# Patient Record
Sex: FEMALE | Race: WHITE | Employment: FULL TIME | URBAN - METROPOLITAN AREA
[De-identification: names, ages, dates, MRNs, and addresses within clinical notes are randomized per-mention and may not be internally consistent; named-entity substitution may affect disease eponyms.]

---

## 2024-07-24 ENCOUNTER — OFFICE VISIT (OUTPATIENT)
Facility: CLINIC | Age: 29
End: 2024-07-24
Payer: COMMERCIAL

## 2024-07-24 VITALS
HEIGHT: 64 IN | TEMPERATURE: 98.3 F | DIASTOLIC BLOOD PRESSURE: 70 MMHG | WEIGHT: 235 LBS | OXYGEN SATURATION: 98 % | BODY MASS INDEX: 40.12 KG/M2 | SYSTOLIC BLOOD PRESSURE: 115 MMHG | HEART RATE: 115 BPM | RESPIRATION RATE: 16 BRPM

## 2024-07-24 DIAGNOSIS — E28.2 PCOS (POLYCYSTIC OVARIAN SYNDROME): ICD-10-CM

## 2024-07-24 DIAGNOSIS — K21.00 GASTROESOPHAGEAL REFLUX DISEASE WITH ESOPHAGITIS WITHOUT HEMORRHAGE: Primary | ICD-10-CM

## 2024-07-24 DIAGNOSIS — E66.01 CLASS 3 SEVERE OBESITY WITHOUT SERIOUS COMORBIDITY WITH BODY MASS INDEX (BMI) OF 40.0 TO 44.9 IN ADULT, UNSPECIFIED OBESITY TYPE (HCC): ICD-10-CM

## 2024-07-24 PROBLEM — E66.813 CLASS 3 SEVERE OBESITY WITHOUT SERIOUS COMORBIDITY WITH BODY MASS INDEX (BMI) OF 40.0 TO 44.9 IN ADULT: Status: ACTIVE | Noted: 2024-07-24

## 2024-07-24 PROCEDURE — 99203 OFFICE O/P NEW LOW 30 MIN: CPT | Performed by: NURSE PRACTITIONER

## 2024-07-24 RX ORDER — PANTOPRAZOLE SODIUM 20 MG/1
TABLET, DELAYED RELEASE ORAL
Qty: 30 TABLET | Refills: 1 | Status: SHIPPED | OUTPATIENT
Start: 2024-07-24

## 2024-07-24 RX ORDER — IBUPROFEN 200 MG
200 TABLET ORAL EVERY 6 HOURS PRN
COMMUNITY

## 2024-07-24 RX ORDER — VALACYCLOVIR HYDROCHLORIDE 1 G/1
TABLET, FILM COATED ORAL
COMMUNITY
Start: 2021-03-18

## 2024-07-24 RX ORDER — OMEPRAZOLE 40 MG/1
CAPSULE, DELAYED RELEASE ORAL DAILY
COMMUNITY
Start: 2024-05-28

## 2024-07-24 ASSESSMENT — ENCOUNTER SYMPTOMS
NAUSEA: 0
ABDOMINAL PAIN: 0
VOMITING: 0

## 2024-07-24 NOTE — PROGRESS NOTES
History of present illness:Kirstie Lemus is a 28 y.o. female presenting for reflux and to establish care    GERD  Mrs. Lemus reports having reflux for many years. She notices worsening symptoms if she eats any food past 7pm. She has taken Omeprazole 40mg daily for 2 years. Had an appointment for an endoscopy while living in [place], but couldn't afford it at the time.      Mrs. Lemus has PCOS, was taking Metformin 500mg once daily which was prescribed by her GYN, but stopped after moving a few months ago. She is trying to lose weight by incorporating healthy foods in her diet as well as exercise without much improvement.    Review of Systems   Constitutional:  Negative for appetite change and unexpected weight change.   Gastrointestinal:  Negative for abdominal pain, nausea and vomiting.           Past Medical History:   Diagnosis Date    Acid reflux     Irritable bowel syndrome     PCOS (polycystic ovarian syndrome)      History reviewed. No pertinent surgical history.  No family history on file.    Prior to Admission medications    Medication Sig Start Date End Date Taking? Authorizing Provider   omeprazole (PRILOSEC) 40 MG delayed release capsule Take by mouth daily 5/28/24  Yes Provider, MD Danielle   valACYclovir (VALTREX) 1 g tablet TAKE 2 TABLETS TWICE A DAY AS NEEDED 3/18/21  Yes Provider, MD Danielle   ibuprofen (ADVIL;MOTRIN) 200 MG tablet Take 1 tablet by mouth every 6 hours as needed   Yes Provider, MD Danielle   pantoprazole (PROTONIX) 20 MG tablet Take by mouth once daily 7/24/24  Yes Nafisa Doss APRN - CNP   metFORMIN (GLUCOPHAGE) 500 MG tablet Take 1 tablet by mouth daily (with breakfast) 7/24/24  Yes Nafisa Doss APRN - CNP        Allergies   Allergen Reactions    Peppermint Oil      Per patient \" full blown migraine\"    Tamiflu [Oseltamivir] Hives       Vitals:    07/24/24 1256   BP: 115/70   Site: Left Upper Arm   Position: Sitting   Cuff Size: Medium Adult   Pulse: (!)

## 2024-07-24 NOTE — PROGRESS NOTES
\"Have you been to the ER, urgent care clinic since your last visit?  Hospitalized since your last visit?\"    Yes, Urgent care Upper Respiratory Infection    “Have you seen or consulted any other health care providers outside of [facility] since your last visit?”    no     “Have you had a pap smear?”    Yes, 2023     No cervical cancer screening on file             Click Here for Release of Records Request

## 2024-07-24 NOTE — ASSESSMENT & PLAN NOTE
Rx for protonix 20mg sent to pharmacy  Referral to GI  Continue to avoid triggers  Follow up 1-2 months

## 2024-08-06 DIAGNOSIS — K21.00 GASTROESOPHAGEAL REFLUX DISEASE WITH ESOPHAGITIS WITHOUT HEMORRHAGE: Primary | ICD-10-CM

## 2024-08-06 RX ORDER — PANTOPRAZOLE SODIUM 40 MG/1
40 TABLET, DELAYED RELEASE ORAL
Qty: 90 TABLET | Refills: 1 | Status: SHIPPED | OUTPATIENT
Start: 2024-08-06

## 2024-10-19 DIAGNOSIS — E28.2 PCOS (POLYCYSTIC OVARIAN SYNDROME): ICD-10-CM

## 2024-11-22 ENCOUNTER — OFFICE VISIT (OUTPATIENT)
Age: 29
End: 2024-11-22

## 2024-11-22 VITALS
SYSTOLIC BLOOD PRESSURE: 131 MMHG | HEART RATE: 118 BPM | DIASTOLIC BLOOD PRESSURE: 85 MMHG | WEIGHT: 230 LBS | BODY MASS INDEX: 39.27 KG/M2 | HEIGHT: 64 IN

## 2024-11-22 DIAGNOSIS — Z12.4 CERVICAL CANCER SCREENING: ICD-10-CM

## 2024-11-22 DIAGNOSIS — E28.2 PCOS (POLYCYSTIC OVARIAN SYNDROME): ICD-10-CM

## 2024-11-22 DIAGNOSIS — Z31.69 INFERTILITY COUNSELING: ICD-10-CM

## 2024-11-22 DIAGNOSIS — N92.6 IRREGULAR MENSES: ICD-10-CM

## 2024-11-22 DIAGNOSIS — Z01.419 ENCOUNTER FOR GYNECOLOGICAL EXAMINATION: Primary | ICD-10-CM

## 2024-11-22 RX ORDER — ACETAMINOPHEN 325 MG/1
650 TABLET ORAL EVERY 6 HOURS PRN
COMMUNITY

## 2024-11-22 NOTE — PROGRESS NOTES
[facility]  [email or web address]  [email or web address]  [phone]    Lilia Gould MD, FACOG      OB/GYN Problem visit    HPI  Kirstie Lemus is a , 29 y.o. female who presents for a problem visit.   Chief Complaint   Patient presents with    Annual Exam     TTC x 2023       Patient with longer cycles and concerns about infertility in addition to her WWE      Per Rooming Note:  History of Present Illness  The patient presents for evaluation of primary infertility.    She was diagnosed with Polycystic Ovary Syndrome (PCOS) in 2023 or 10/2023, confirmed by an ultrasound in . She has not undergone any fertility treatments. She experienced one early miscarriage, which she attributes to a fall that resulted in a broken foot. She uses ovulation tests daily to track her Luteinizing Hormone (LH) levels. Her partner has not had a semen analysis, but she notes that he has a low sex drive and a small penis. She has lost 22 pounds since 2024 and is currently taking prenatal vitamins and metformin 500 mg once daily, which she started in 10/2023 for PCOS.    Her menstrual cycles have become more regular since moving to her current location in 2024, with cycles ranging from 39 to 45 days. She had one cycle that lasted 49 days. She used to skip cycles, but this has not occurred in the past 6 months.    She reports no new lumps or bumps in her breast tissue, although she notes that her left breast has always been smaller than the right since puberty. She has no history of abnormal Pap smears or biopsies.        Sexual history and Contraception:  Social History     Substance and Sexual Activity   Sexual Activity Yes    Partners: Male    Birth control/protection: None    Comment: TTC       Past Medical History:   Diagnosis Date    Acid reflux     H/O cold sores     oral    Irritable bowel syndrome     PCOS (polycystic ovarian syndrome) 
  Kirstie Lemus is a 29 y.o. female returns for an annual exam     Chief Complaint   Patient presents with    Annual Exam     TTC x 2023       Patient's last menstrual period was 2024.  Her periods are moderate in flow and between 39-45 days between, lasting about 5 days.  She has some dysmenorrhea.   Problems: TTC since 2023.   Birth Control: none TTC.  Last Pap: before 10/2023 WNL per pt  She does not have a history of ANGIE 2, 3 or cervical cancer.   With regard to the Gardisil vaccine, she has received all 3 injections    *partner does not have a chart in [facility]*      Examination chaperoned by Donna Cook MA.    Records labs:   10/03/2023   Prolactin: 9.5   DHEA: 274.0   Testosterone: 63.6   Free Testosterone: 8.22   Albumin: 4.6   Sex Hormone Bindin.9   TSH w/ reflex to to: 1.290      Had SAB (no pos upt per records)       Ultrasound from records from 2023 (see page 9 or 13 in records)   
file     Lack of Transportation (Non-Medical): No   Physical Activity: Insufficiently Active (7/24/2024)    Exercise Vital Sign     Days of Exercise per Week: 1 day     Minutes of Exercise per Session: 40 min   Stress: Not on file   Social Connections: Not on file   Intimate Partner Violence: Not on file   Housing Stability: Unknown (7/24/2024)    Housing Stability Vital Sign     Unable to Pay for Housing in the Last Year: Not on file     Number of Places Lived in the Last Year: Not on file     Unstable Housing in the Last Year: No     Tobacco History:  reports that she has never smoked. She has never been exposed to tobacco smoke. She has never used smokeless tobacco.  Alcohol Abuse:  reports no history of alcohol use.  Drug Abuse:  reports no history of drug use.  Allergies:   Allergies   Allergen Reactions    Peppermint Oil      Per patient \" full blown migraine\"    Tamiflu [Oseltamivir] Hives     Patient Active Problem List   Diagnosis    Gastroesophageal reflux disease with esophagitis without hemorrhage    Class 3 severe obesity without serious comorbidity with body mass index (BMI) of 40.0 to 44.9 in adult    PCOS (polycystic ovarian syndrome)       Review of Systems - History obtained from the patient and patient filled out questionnaire   Constitutional/general, HEENT, CV, Resp, GI, MSK, Neuro, Psych, Heme/lymph, Skin, Breast ROS: no significant complaints except as noted on HPI    Physical Exam  /85   Pulse (!) 118   Ht 1.626 m (5' 4\")   Wt 104.3 kg (230 lb)   LMP 11/03/2024   BMI 39.48 kg/m²     Constitutional  Appearance: alert, in no acute distress    HENT  Head and Face: appears normal    Neck  Inspection/Palpation: normal appearance    Breasts  Inspection of Breasts: breasts symmetrical, no skin changes, no discharge present, nipple appearance normal, no skin retraction present  Palpation of Breasts and Axillae: no masses present on palpation, no breast tenderness  Axillary Lymph Nodes: no

## 2024-11-23 LAB
EST. AVERAGE GLUCOSE BLD GHB EST-MCNC: 103 MG/DL
HBA1C MFR BLD: 5.2 % (ref 4–5.6)
PROLACTIN SERPL-MCNC: 9 NG/ML
T4 FREE SERPL-MCNC: 1 NG/DL (ref 0.8–1.5)
TSH SERPL DL<=0.05 MIU/L-ACNC: 1.5 UIU/ML (ref 0.36–3.74)

## 2024-11-25 LAB — DHEA-S SERPL-MCNC: 316 UG/DL (ref 84.8–378)

## 2024-11-26 LAB — 17OHP SERPL-MCNC: 58 NG/DL

## 2024-11-27 LAB
TESTOST FREE SERPL-MCNC: 3.1 PG/ML (ref 0–4.2)
TESTOST SERPL-MCNC: 61 NG/DL (ref 10–55)

## 2024-11-28 LAB
., LABCORP: NORMAL
CYTOLOGIST CVX/VAG CYTO: NORMAL
CYTOLOGY CVX/VAG DOC CYTO: NORMAL
CYTOLOGY CVX/VAG DOC THIN PREP: NORMAL
DX ICD CODE: NORMAL
Lab: NORMAL
OTHER STN SPEC: NORMAL
STAT OF ADQ CVX/VAG CYTO-IMP: NORMAL

## 2024-12-05 ENCOUNTER — TELEPHONE (OUTPATIENT)
Age: 29
End: 2024-12-05

## 2024-12-05 ENCOUNTER — LAB (OUTPATIENT)
Age: 29
End: 2024-12-05

## 2024-12-05 DIAGNOSIS — N92.6 IRREGULAR PERIODS: ICD-10-CM

## 2024-12-05 DIAGNOSIS — Z30.09 FAMILY PLANNING: Primary | ICD-10-CM

## 2024-12-07 LAB — PROGEST SERPL-MCNC: 0.2 NG/ML

## 2024-12-08 ENCOUNTER — TELEPHONE (OUTPATIENT)
Age: 29
End: 2024-12-08

## 2024-12-08 DIAGNOSIS — E28.2 PCOS (POLYCYSTIC OVARIAN SYNDROME): ICD-10-CM

## 2024-12-09 NOTE — TELEPHONE ENCOUNTER
30 yo last ov/ae 11/22/24    Rx was last sent 11  metFORMIN (GLUCOPHAGE) 500 MG tablet 60 tablet 1 11/24/2024 12/24/2024 /24/24:      Per ae plan/note:  Rec titration of metformin as tolerated based on GI sx. Increase from qd to bid. Continue increasing to 1500/2000mg /day if tolerated.       Pharmacy is requesting change in refill amount:  Pharmacy comment: REQUEST FOR 90 DAYS PRESCRIPTION.       Please review    Thank you

## 2024-12-09 NOTE — TELEPHONE ENCOUNTER
PT was contacted by RN, name and  verified    RN relayed the Rx request was received and MD sent message in regards to the refill:  Lilia Gould MD   to Me       24 12:23 PM  Ok to update to 90 day supply.    TRP    PT reports she is taking 1000 mg since her last ov 24, and has had no GI sx.   PT reports she has some Rx left over from the summer, when she took a break and did not take the medication. PT would like to \"work through\" what supply she has now and re-evaluate when she comes at the end of Feb (25).  Per PT request Rx denied at this time.      HUMBERTO

## 2025-01-28 ENCOUNTER — TELEPHONE (OUTPATIENT)
Age: 30
End: 2025-01-28

## 2025-01-28 NOTE — TELEPHONE ENCOUNTER
Called [facility], I advised they tried scheduling their semen analysis appt & she states the order form needs the ICD10 codes on it, I asked if they had a form for them to send us for the order, at first she said no, then checked the andrology dept then advised me she would be sending a form to us for him.

## 2025-01-28 NOTE — TELEPHONE ENCOUNTER
PT name and  verified    28 yo last ov/ae 24, last lab ov 24    PT calling stating they had the referral for [facility] and was told that they cannot do anything with just the referral they will need an actual order. PT asked if MW could place the order, and RN relayed that MW could not, that any orders have to be reviewed by the MD, cannot just place orders without MD's permission, verbal order.  RN relayed would send the message and see what can be done to MW and .    Please advise  Thank you

## 2025-02-10 DIAGNOSIS — K21.00 GASTROESOPHAGEAL REFLUX DISEASE WITH ESOPHAGITIS WITHOUT HEMORRHAGE: ICD-10-CM

## 2025-02-11 RX ORDER — PANTOPRAZOLE SODIUM 40 MG/1
40 TABLET, DELAYED RELEASE ORAL
Qty: 30 TABLET | Refills: 0 | Status: SHIPPED | OUTPATIENT
Start: 2025-02-11

## 2025-03-04 ENCOUNTER — LAB (OUTPATIENT)
Age: 30
End: 2025-03-04

## 2025-03-04 DIAGNOSIS — Z31.69 INFERTILITY COUNSELING: ICD-10-CM

## 2025-03-04 DIAGNOSIS — Z30.09 FAMILY PLANNING: Primary | ICD-10-CM

## 2025-03-06 LAB — PROGEST SERPL-MCNC: 8.1 NG/ML

## 2025-03-10 ENCOUNTER — LAB (OUTPATIENT)
Age: 30
End: 2025-03-10

## 2025-03-10 ENCOUNTER — PATIENT MESSAGE (OUTPATIENT)
Age: 30
End: 2025-03-10

## 2025-03-10 ENCOUNTER — TELEPHONE (OUTPATIENT)
Age: 30
End: 2025-03-10

## 2025-03-10 DIAGNOSIS — Z32.01 POSITIVE PREGNANCY TEST: ICD-10-CM

## 2025-03-10 DIAGNOSIS — N92.6 MISSED PERIOD: Primary | ICD-10-CM

## 2025-03-10 DIAGNOSIS — Z87.59 HISTORY OF MISCARRIAGE: ICD-10-CM

## 2025-03-10 DIAGNOSIS — O36.80X0 PREGNANCY OF UNKNOWN ANATOMIC LOCATION: ICD-10-CM

## 2025-03-10 NOTE — TELEPHONE ENCOUNTER
This nurse spoke to work in MD , Dr. Jimenez and he would like the patient to have blood work today and then again on 3/12/2025 to check beta hcg and then patient can meet with  on 3/13/2025 to discuss results and next steps.      Patient has been scheduled for lab work only today and on 3/12/2025 for bet hcg testing.      Patient verbalized understanding.  Lab orders placed for beta hcg as per MD verbal order .

## 2025-03-10 NOTE — TELEPHONE ENCOUNTER
Pt called triage, discussed with Nisreen, rec beta hcg today & Wednesday before appt on Thursday if ok with work-in MD (Dr. Jimenez). Nisreen will ask Dr. Jimenez

## 2025-03-10 NOTE — TELEPHONE ENCOUNTER
Two patient identifiers used      29 year old patient last seen in the office on 11/22/2024 for ae. Patient was last seen in the office on 2/28/2025 for problem visit and has infertility visit on 3/13/2025      Patient calling to say that she got a faint positive upt test 3 times today and her LMP is 1/31/2025.( 5w 3d)    Patient reports her cycles are usually 39 days in length.    Patient denies vaginal bleeding and cramping. Patient was advised to increase her po fluids.          Patient reports history of miscarriage and would like to have beta hcg checked.    ? Ok to place patient on the lab schedule for beta hcg testing for tomorrow and than patient can repeat at her upcoming visit on 3/13/2025.( Patient wants to keep visit to discuss labs and recommendations for ob MD )          Please advise    Thank you

## 2025-03-11 LAB — HCG SERPL-ACNC: 8 MIU/ML (ref 0–6)

## 2025-03-12 ENCOUNTER — RESULTS FOLLOW-UP (OUTPATIENT)
Age: 30
End: 2025-03-12

## 2025-03-12 ENCOUNTER — TELEPHONE (OUTPATIENT)
Age: 30
End: 2025-03-12

## 2025-03-12 ENCOUNTER — LAB (OUTPATIENT)
Age: 30
End: 2025-03-12

## 2025-03-12 DIAGNOSIS — R79.89 ELEVATED SERUM HCG: ICD-10-CM

## 2025-03-12 DIAGNOSIS — O36.80X0 PREGNANCY OF UNKNOWN ANATOMIC LOCATION: ICD-10-CM

## 2025-03-12 DIAGNOSIS — N92.6 MISSED MENSES: Primary | ICD-10-CM

## 2025-03-12 DIAGNOSIS — Z32.01 POSITIVE PREGNANCY TEST: ICD-10-CM

## 2025-03-12 NOTE — TELEPHONE ENCOUNTER
Two patient identifiers used        29 year old patient last seen in the office on 11/22/2024 .    Patient got a positive upt on 3/10/2025 with LMP of 1/31/2025( 5w5d)    Patient had beta done on 3/10/2025 and is scheduled for repeat beta today.    Patient is wondering why the line of her urine pregnancy testing is not getting darker and does she need to repeat her progesterone level ( last one done was 3/4/2025)    Per Dr. Gould patient was advised that not she does not need a repeat progesterone and that the most important lab right now is to  repeat the beta hcg.    Patient has will come for lab only appointment today and then has ov tomorrow to discuss recent labs and next steps.      Patient verbalized understanding.

## 2025-03-13 ENCOUNTER — TELEPHONE (OUTPATIENT)
Age: 30
End: 2025-03-13

## 2025-03-13 ENCOUNTER — OFFICE VISIT (OUTPATIENT)
Age: 30
End: 2025-03-13
Payer: COMMERCIAL

## 2025-03-13 VITALS — SYSTOLIC BLOOD PRESSURE: 126 MMHG | DIASTOLIC BLOOD PRESSURE: 88 MMHG | BODY MASS INDEX: 38.62 KG/M2 | WEIGHT: 225 LBS

## 2025-03-13 DIAGNOSIS — O36.80X0 PREGNANCY OF UNKNOWN ANATOMIC LOCATION: Primary | ICD-10-CM

## 2025-03-13 LAB — HCG SERPL-ACNC: 19 MIU/ML (ref 0–6)

## 2025-03-13 PROCEDURE — 99213 OFFICE O/P EST LOW 20 MIN: CPT | Performed by: OBSTETRICS & GYNECOLOGY

## 2025-03-13 RX ORDER — OMEPRAZOLE 20 MG/1
20 CAPSULE, DELAYED RELEASE ORAL 2 TIMES DAILY
COMMUNITY
End: 2025-03-13 | Stop reason: SDUPTHER

## 2025-03-13 RX ORDER — OMEPRAZOLE 20 MG/1
40 CAPSULE, DELAYED RELEASE ORAL DAILY
Qty: 30 CAPSULE | Refills: 0 | Status: SHIPPED | OUTPATIENT
Start: 2025-03-13

## 2025-03-13 NOTE — PROGRESS NOTES
[facility]  [email or web address]  [email or web address]  [phone]    Lilia Gould MD, FACOG      OB/GYN Problem visit    HPI  Kirstie Lemus is a , 29 y.o. female who presents for a problem visit.   Chief Complaint   Patient presents with    Pos Pregnancy Test       Ovulation .  UPT 3/10     Pt co fatigue, no bleeding, some food aversion but increased appetite.  Nervous/anxious about pregnancy.    Per Rooming Note:  Patient's last menstrual period was 2025.  Birth Control: none.     Last or next WWE is: 2024     The patient is reporting having:   Pt reports LMP 2025, progesterone done 2025, had beta hcg done 03/10/2025, had another beta hcg done 2025 & results are pending.      Pt reports needing medication for acid reflux, was taking pantoprazole, switched to her late family members 20mg of omeprazole 2 times a day      Pt has a hx of miscarriage & wants to know what can help current pregnancy.      This is a new problem.  She has experienced this problem before.     She reports the symptoms are is unchanged.  Aggravating factors include none.  And alleviating factors include none.     She does not have other concerns.    Sexual history and Contraception:  Social History     Substance and Sexual Activity   Sexual Activity Yes    Partners: Male    Birth control/protection: None    Comment: TTC       Past Medical History:   Diagnosis Date    Acid reflux     H/O cold sores     oral    Irritable bowel syndrome     Pap smear for cervical cancer screening 2024    WNL/HPV neg    PCOS (polycystic ovarian syndrome)          Current Outpatient Medications:     omeprazole (PRILOSEC) 20 MG delayed release capsule, Take 2 capsules by mouth Daily, Disp: 30 capsule, Rfl: 0    Prenatal Vit-Fe Fumarate-FA (PRENATAL PO), Take by mouth, Disp: , Rfl:     pantoprazole (PROTONIX) 40 MG tablet, TAKE 1 TABLET BY

## 2025-03-13 NOTE — TELEPHONE ENCOUNTER
Called [facility] & spoke with Laney & she reports there was unexpected downtime & the specimen has not been processed yet. She advised results should be back ideally sometime today/tonight. I advised pt is in the office now & wondering if there is a way to get the specimen ran now since we need results now. She is looking through 15 bags of specimens to try to find pt's specimen.     Round it, received it, will run STAT, hopefully within the next hour/hour & a half we should have results.

## 2025-03-13 NOTE — RESULT ENCOUNTER NOTE
Reviewed.  A little low for ovulation, what day of the cycle was this done? Or was this drawn when pos upt.

## 2025-03-13 NOTE — PROGRESS NOTES
Rooming note, gyn problem visit:    Chief Complaint   Patient presents with    Pos Pregnancy Test     Kirstie Lemus is a 29 y.o. female presents for a problem visit.    Patient's last menstrual period was 01/31/2025.  Birth Control: none.    Last or next WWE is: 11/22/2024    The patient is reporting having:   Pt reports LMP 01/31/2025, progesterone done 03/04/2025, had beta hcg done 03/10/2025, had another beta hcg done 03/12/2025 & results are pending.     Pt reports needing medication for acid reflux, was taking pantoprazole, switched to her late family members 20mg of omeprazole 2 times a day     Pt has a hx of miscarriage & wants to know what can help current pregnancy.     This is a new problem.  She has experienced this problem before.    She reports the symptoms are is unchanged.  Aggravating factors include none.  And alleviating factors include none.    She does not have other concerns.

## 2025-03-14 ENCOUNTER — RESULTS FOLLOW-UP (OUTPATIENT)
Age: 30
End: 2025-03-14

## 2025-03-14 ENCOUNTER — LAB (OUTPATIENT)
Age: 30
End: 2025-03-14

## 2025-03-14 DIAGNOSIS — R79.89 ELEVATED SERUM HCG: ICD-10-CM

## 2025-03-14 DIAGNOSIS — Z32.01 POSITIVE PREGNANCY TEST: ICD-10-CM

## 2025-03-14 DIAGNOSIS — O09.299 H/O MISCARRIAGE, CURRENTLY PREGNANT: ICD-10-CM

## 2025-03-14 DIAGNOSIS — O36.80X0 PREGNANCY OF UNKNOWN ANATOMIC LOCATION: Primary | ICD-10-CM

## 2025-03-14 LAB — HCG SERPL-ACNC: 20 MIU/ML (ref 0–6)

## 2025-03-14 NOTE — RESULT ENCOUNTER NOTE
HCG rising repeat hcg add progesterone 3/14  Rec eob/ US (rotate)  ~2 weeks    message sent if active

## 2025-03-15 ENCOUNTER — RESULTS FOLLOW-UP (OUTPATIENT)
Age: 30
End: 2025-03-15

## 2025-03-15 DIAGNOSIS — K21.00 GASTROESOPHAGEAL REFLUX DISEASE WITH ESOPHAGITIS WITHOUT HEMORRHAGE: ICD-10-CM

## 2025-03-16 NOTE — RESULT ENCOUNTER NOTE
Abnormal results   message sent if active on [facility].  Notify patient if not on [facility] or message not read within two weeks.  Call or send letter and document in chart.  Repeat quant hcg mon/tues if able

## 2025-03-17 ENCOUNTER — LAB (OUTPATIENT)
Age: 30
End: 2025-03-17

## 2025-03-17 ENCOUNTER — TELEPHONE (OUTPATIENT)
Age: 30
End: 2025-03-17

## 2025-03-17 ENCOUNTER — RESULTS FOLLOW-UP (OUTPATIENT)
Age: 30
End: 2025-03-17

## 2025-03-17 DIAGNOSIS — R79.89 ELEVATED SERUM HCG: ICD-10-CM

## 2025-03-17 DIAGNOSIS — O36.80X0 PREGNANCY OF UNKNOWN ANATOMIC LOCATION: ICD-10-CM

## 2025-03-17 DIAGNOSIS — O20.9 BLEEDING IN EARLY PREGNANCY: Primary | ICD-10-CM

## 2025-03-17 LAB — HCG SERPL-ACNC: 4 MIU/ML (ref 0–6)

## 2025-03-17 RX ORDER — PANTOPRAZOLE SODIUM 40 MG/1
40 TABLET, DELAYED RELEASE ORAL
Qty: 90 TABLET | Refills: 1 | OUTPATIENT
Start: 2025-03-17

## 2025-03-17 NOTE — TELEPHONE ENCOUNTER
PT name and  verified    28 yo last ov 3/13/25, lab ov 3/14/25      PT calling stating she thinks she is having a miscarriage and she needs to know what she needs to do, as her labs from Fri 3/14, were low so she was expecting based on the results.  RN reviewed beta from 3/14 and saw MD had reviewed the results and sent a message:  Lilia Gould MD  3/15/2025  8:14 PM EDT Back to Top      Abnormal results  MC message sent if active on [facility].  Notify patient if not on MC or message not read within two weeks.  Call or send letter and document in chart.  Repeat quant hcg mon/tu if able     Your beta is not rising normally, but it is still very early, although it could be a marker of an abnormal pregnancy.      Please monitor for signs and symptoms of a miscarriage (like bleeding with cramping).     I would like you to repeat the labs again early next week, to reassess the trend.     I encourage you to learn more about each test by accessing the resources available through [facility]. Click on the test name if you would like to see a description of the test.      Please send a message or contact the office during business hours to discuss your results and my recommendations, if needed.  For a more detailed discussion an appointment can be scheduled.     Thank you for letting us participate in your medical care and please contact the office with any questions or concerns.     ~Lilia Gould MD, FACOG     Written by Lilia Gould MD on 3/15/2025  8:14 PM EDT  Seen by patient Kirstie Lemus on 3/17/2025  8:20 AM         PT was asked to hold while RN consulted with  to see if she would like the PT to come in for labs and or ov.   would like PT to come in today for a repeat beta to see what is going on and will be sent the message to see where PT can be fit in for an ov for Wed.  RN relayed MD's response and PT scheduled for a repeat beta today at 1030 am, and advised to watch for

## 2025-03-18 NOTE — TELEPHONE ENCOUNTER
MD reviewed beta hcg result and sent PT a message, which was acknowledged by the PT:    Unfortunately your HCG level is falling which is most consistent with a miscarriage.   Please schedule a follow up and we can discuss the next steps in more detail.     Lilia Gould MD   Written by Lilia Gould MD on 3/17/2025  9:09 PM EDT  Seen by patient Kirstie Lemus on 3/17/2025  9:47 PM    Lilia Gould MD  3/17/2025  9:09 PM EDT Back to Top      Reviewed.  C/w  sab  Rec fu to discuss in more detail ~ 1 wk       PT has an appt for 3/20/25 at 1120 am.

## 2025-03-19 DIAGNOSIS — K21.00 GASTROESOPHAGEAL REFLUX DISEASE WITH ESOPHAGITIS WITHOUT HEMORRHAGE: ICD-10-CM

## 2025-03-19 RX ORDER — PANTOPRAZOLE SODIUM 40 MG/1
40 TABLET, DELAYED RELEASE ORAL
Qty: 30 TABLET | Refills: 0 | OUTPATIENT
Start: 2025-03-19

## 2025-03-20 ENCOUNTER — OFFICE VISIT (OUTPATIENT)
Age: 30
End: 2025-03-20
Payer: COMMERCIAL

## 2025-03-20 VITALS
DIASTOLIC BLOOD PRESSURE: 86 MMHG | HEART RATE: 85 BPM | SYSTOLIC BLOOD PRESSURE: 125 MMHG | BODY MASS INDEX: 38.96 KG/M2 | WEIGHT: 227 LBS

## 2025-03-20 DIAGNOSIS — Z30.42 FAMILY PLANNING, DEPO-PROVERA CONTRACEPTION MONITORING/ADMINISTRATION: Primary | ICD-10-CM

## 2025-03-20 DIAGNOSIS — O03.9 MISCARRIAGE: ICD-10-CM

## 2025-03-20 PROCEDURE — 99213 OFFICE O/P EST LOW 20 MIN: CPT | Performed by: OBSTETRICS & GYNECOLOGY

## 2025-03-20 NOTE — PROGRESS NOTES
Rooming note, gyn problem visit:    Chief Complaint   Patient presents with    Follow Up After Miscarriage    Follow-up     miscarriage     Kirstie Lemus is a 29 y.o. female presents for a problem visit.    Patient's last menstrual period was 01/31/2025.  Birth Control: none.    Last or next WWE is: 11/22/2024    The patient is reporting having: follow up after miscarriage   Cramping started 03/15/2025. Pt started having more severe cramping & significant bleeding on 03/17/2025.   Beta hcg done 03/17/2025 & was 4.   Pt wondering when to expect her cycles to return  Pt wondering when she can start trying to conceive again  Pt wondering about future fertility, has a follow up with Dr. Jimenez scheduled for 04/08/2025.       This is a new problem.  She has experienced this problem before.    She reports the symptoms are is unchanged.  Aggravating factors include none.  And alleviating factors include none.    She does not have other concerns.

## 2025-03-20 NOTE — PROGRESS NOTES
[facility]  [email or web address]  [email or web address]  [phone]    Lilia Gould MD, FACOG      OB/GYN Problem visit    HPI  Kirstie Lemus is a , 29 y.o. female who presents for a problem visit.   Chief Complaint   Patient presents with    Follow Up After Miscarriage    Follow-up     miscarriage       History of Present Illness  The patient presents for evaluation of a miscarriage.    She reports a slight improvement in her emotional state compared to the previous weekend. She has been experiencing persistent bleeding, which is not severe but noticeable upon wiping. The presence of small blood clots has also been observed. The severity of her symptoms has been likened to a particularly intense menstrual period, with today marking the first day of relief. She has been experiencing intermittent cramping, which she describes as tolerable. She expresses concern about the potential impact of low progesterone levels on her current condition. Additionally, she reports the passage of a significant clot yesterday, which did not appear to be solely composed of blood.        Per Rooming Note:    Patient's last menstrual period was 2025.  Birth Control: none.     Last or next WWE is: 2024     The patient is reporting having: follow up after miscarriage   Cramping started 03/15/2025. Pt started having more severe cramping & significant bleeding on 2025.   Beta hcg done 2025 & was 4.   Pt wondering when to expect her cycles to return  Pt wondering when she can start trying to conceive again  Pt wondering about future fertility, has a follow up with Dr. Jimenez scheduled for 2025.         This is a new problem.  She has experienced this problem before.     She reports the symptoms are is unchanged.  Aggravating factors include none.  And alleviating factors include none.     She does not have other

## 2025-04-07 NOTE — PROGRESS NOTES
Kirstie Lemus is a 29 y.o. female presents for a problem visit.    Chief Complaint   Patient presents with    Consultation     Fertility       Patient's last menstrual period was 01/31/2025.  Birth Control: none.  Last Pap: normal obtained 11/22/2024.    The patient is reporting having:  fertility consult .        1. Have you been to the ER, urgent care clinic, or hospitalized since your last visit? No    2. Have you seen or consulted any other health care providers outside of the [facility] since your last visit? No    Examination chaperoned by DANIELLE HOLLIS CMA.

## 2025-04-08 ENCOUNTER — OFFICE VISIT (OUTPATIENT)
Age: 30
End: 2025-04-08
Payer: COMMERCIAL

## 2025-04-08 VITALS
BODY MASS INDEX: 39.47 KG/M2 | HEIGHT: 64 IN | RESPIRATION RATE: 18 BRPM | HEART RATE: 93 BPM | SYSTOLIC BLOOD PRESSURE: 145 MMHG | DIASTOLIC BLOOD PRESSURE: 86 MMHG | WEIGHT: 231.2 LBS

## 2025-04-08 DIAGNOSIS — E28.2 PCOS (POLYCYSTIC OVARIAN SYNDROME): ICD-10-CM

## 2025-04-08 DIAGNOSIS — E66.01 MORBID OBESITY: Primary | ICD-10-CM

## 2025-04-08 PROCEDURE — 99214 OFFICE O/P EST MOD 30 MIN: CPT | Performed by: OBSTETRICS & GYNECOLOGY

## 2025-04-08 RX ORDER — METFORMIN HYDROCHLORIDE 500 MG/1
500 TABLET, EXTENDED RELEASE ORAL
Qty: 90 TABLET | Refills: 1 | Status: SHIPPED | OUTPATIENT
Start: 2025-04-08

## 2025-04-08 NOTE — PROGRESS NOTES
PROBLEM VISIT      Kirstie Lemus is 29 y.o. year old White (non-)  female who presents for fertility discussion.      History of Present Illness  The patient presents for evaluation of fertility issues, PCOS, and acid reflux. She is accompanied by her .    Approximately 4 weeks ago, she had a scheduled appointment with Dr. Rahman to discuss her 's test results. However, she became pregnant the following Monday, turning the appointment into an initial obstetric visit. Unfortunately, she experienced a miscarriage the subsequent Monday, with an hCG level recorded at 8. Her 's test results were reported as normal. The couple has been actively trying to conceive for nearly 2 years. She has been tracking her menstrual cycles and ovulation, confirming monthly ovulation. She met with Dr. Rahman in 2024 and again in 2025, during which she was advised to undergo a progesterone draw. She recalls an incident 3 to 4 years ago when an OB-GYN in [place] made inappropriate comments about her weight affecting her fertility.    She has been diagnosed with polycystic ovary syndrome (PCOS) and reports difficulty in losing weight, which she attributes to her PCOS. Her menstrual cycles currently range from 39 to 45 days, a significant deviation from the more regular cycles she experienced during high school when she weighed approximately 165 pounds. She is currently on a regimen of prenatal vitamins with omega-3. She is considering increasing her metformin dosage but expresses concern about potential side effects, as previous attempts to increase the dosage resulted in adverse reactions. She is currently on a regimen of metformin 500 mg once daily.    She suffers from severe acid reflux, a condition that runs in her family. She was previously on pantoprazole but was switched to omeprazole by Dr. Doty due to its safer profile during pregnancy. This change has resulted in a decrease in

## 2025-05-27 ENCOUNTER — LAB (OUTPATIENT)
Age: 30
End: 2025-05-27

## 2025-05-27 ENCOUNTER — TELEPHONE (OUTPATIENT)
Age: 30
End: 2025-05-27

## 2025-05-27 DIAGNOSIS — N92.6 MISSED MENSES: Primary | ICD-10-CM

## 2025-05-27 NOTE — TELEPHONE ENCOUNTER
Andreas Jimenez II, MD to Me      5/27/25  9:25 AM  Yes emy sounds good      Patient advised of MD recommendations and was placed on the schedule for lab work today at 11:00 am and then for repeat labs on 5/29/2025 at 11:00 am      Lab orders pended for MD to sign    Patient advised to increase po fluids to help with headache and ok to take tylenol per package directions.    Patient verbalized understanding.

## 2025-05-27 NOTE — TELEPHONE ENCOUNTER
Two patient identifiers used    29 year old patient last seen in the office on 4/8/2025 for problem visit.   Previous Dr Gould patient.    Patient reports having a miscarriage this spring and was told when she gets pregnant again she would need beta hcg levels and progesterone levels.checked.      Patient denies vaginal bleeding and has some cramping but not menstrual. Patient believes that the cramping is gas related.      Please advise      ? Ok to schedule lab visit for beta and progesterone and than repeat beta in 48 hours.      Thank you

## 2025-05-28 ENCOUNTER — RESULTS FOLLOW-UP (OUTPATIENT)
Age: 30
End: 2025-05-28

## 2025-05-28 LAB — HCG SERPL-ACNC: 40 MIU/ML (ref 0–6)

## 2025-05-29 ENCOUNTER — LAB (OUTPATIENT)
Age: 30
End: 2025-05-29

## 2025-05-29 DIAGNOSIS — N92.6 MISSED MENSES: Primary | ICD-10-CM

## 2025-05-29 LAB — PROGEST SERPL-MCNC: 13 NG/ML

## 2025-05-30 ENCOUNTER — TELEPHONE (OUTPATIENT)
Age: 30
End: 2025-05-30

## 2025-05-30 LAB — HCG SERPL-ACNC: 94 MIU/ML (ref 0–6)

## 2025-05-30 RX ORDER — PROGESTERONE 200 MG/1
200 CAPSULE ORAL NIGHTLY
Qty: 30 CAPSULE | Refills: 1 | Status: SHIPPED | OUTPATIENT
Start: 2025-05-30 | End: 2025-07-29

## 2025-05-31 ENCOUNTER — RESULTS FOLLOW-UP (OUTPATIENT)
Age: 30
End: 2025-05-31

## 2025-06-02 ENCOUNTER — LAB (OUTPATIENT)
Age: 30
End: 2025-06-02

## 2025-06-02 DIAGNOSIS — Z32.01 POSITIVE BLOOD PREGNANCY TEST: Primary | ICD-10-CM

## 2025-06-02 LAB — HCG SERPL-ACNC: 511 MIU/ML (ref 0–6)

## 2025-06-03 ENCOUNTER — RESULTS FOLLOW-UP (OUTPATIENT)
Age: 30
End: 2025-06-03

## 2025-06-03 RX ORDER — OMEPRAZOLE 40 MG/1
40 CAPSULE, DELAYED RELEASE ORAL DAILY
Qty: 30 CAPSULE | Refills: 2 | Status: SHIPPED | OUTPATIENT
Start: 2025-06-03 | End: 2025-09-01

## 2025-06-09 ENCOUNTER — LAB (OUTPATIENT)
Age: 30
End: 2025-06-09

## 2025-06-09 DIAGNOSIS — Z32.01 PREGNANCY EXAMINATION OR TEST, POSITIVE RESULT: Primary | ICD-10-CM

## 2025-06-09 LAB — HCG SERPL-ACNC: 3576 MIU/ML (ref 0–6)

## 2025-06-10 ENCOUNTER — TELEPHONE (OUTPATIENT)
Age: 30
End: 2025-06-10

## 2025-06-10 NOTE — TELEPHONE ENCOUNTER
PT was called back, name and  verified    RN relayed 's message/advice:  Neli Parks MD to Me      6/10/25 12:10 PM  She has had normal beta hcgs thus far, what I would suggest next is an ultrasound. I don't see the utility in repeating further, unless that was Dr. Jimenez's plan    Neli Parks MD to Me      6/10/25 12:15 PM  Yeah, that's fine, you can put it in and I'm happy to sign  Me to Neli Parks MD  KB      6/10/25 12:12 PM  PT has us , per PT request/ she wants betas weekly.  Are you ok for the order for repeat for ?    Thank you    PT states she would like to come  around 10 am. PT was placed on the schedule,lab order repeat beta hcg pended.  PT states could she get an earlier us/ov, she will be around 8 weeks next Fri.  RN relayed that  would defer that decision to , that he would have to advise, as there is no available appointments, he would not be able to address until after returning 25.    PT verbalizes understanding.  PT asks if there is anything else to monitor while she is away, besides bleeding and or cramping. RN noted that some mild intermittent abdominal cramping is normal, any period like cramping, consistent, and or bright red bleeding, having to wear a pad, saturating a pad and or passing clots to call and be seen, ER or UC if out of state.

## 2025-06-10 NOTE — TELEPHONE ENCOUNTER
PT name and  verified      30 yo last ov/labs 25, last ae 24  LMP 25(6w4d)  Former [facility] patient, continuing care with       PT calling in regards to her repeat beta hcg , stating she received the results, she knows  is out of the office, and was seeing if there was a MD that could read them and advise.  PT states she thinks that the number should double within 48 hours, or could be common to within 72 hours. PT is asking should she worry based on the results? 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   PT is also asking if she can get a repeat beta this , as she has been doing them weekly, okay per , and she will be out of [place] next week and will not be able to get them next week.  PT is asking if the work in MD would approve and place orders?    Please advise/results  Approve repeat beta/orders for   GM patient    Thank you

## 2025-06-12 ENCOUNTER — LAB (OUTPATIENT)
Age: 30
End: 2025-06-12

## 2025-06-12 DIAGNOSIS — Z32.01 POSITIVE PREGNANCY TEST: Primary | ICD-10-CM

## 2025-06-13 NOTE — TELEPHONE ENCOUNTER
Two patient identifiers used      29 year old patient had repeat beta done yesterday and is still waiting to the results.    This nurse called [facility] lab and was advised that the lab is backed up and they are working on labs and results will be posted to [facility] as soon as they are process.    Patient was advised and verbalized understanding.    Patient will continue to increase po fluids and check her my chart for when the labs come in and call the office with any change in her symptoms and to check on the lab results.        Patient denies vaginal bleeding and cramping.

## 2025-06-14 LAB — HCG SERPL-ACNC: 5803 MIU/ML (ref 0–6)

## 2025-06-22 ENCOUNTER — TELEPHONE (OUTPATIENT)
Age: 30
End: 2025-06-22

## 2025-06-23 ENCOUNTER — LAB (OUTPATIENT)
Age: 30
End: 2025-06-23

## 2025-06-23 DIAGNOSIS — Z32.01 POSITIVE PREGNANCY TEST: Primary | ICD-10-CM

## 2025-06-23 LAB — HCG SERPL-ACNC: ABNORMAL MIU/ML (ref 0–6)

## 2025-06-23 RX ORDER — PROGESTERONE 200 MG/1
200 CAPSULE ORAL NIGHTLY
Qty: 90 CAPSULE | Refills: 1 | Status: SHIPPED | OUTPATIENT
Start: 2025-06-23 | End: 2025-08-22

## 2025-06-23 NOTE — TELEPHONE ENCOUNTER
29 year old patient having beta labs done , scheduled for lab today to check hcg    Patient has first ob and ultrasound visit on 7/1/2025 . Lmp 4/25/2025    Requested prescription last sent on 5/30/2025.    Please review and advise regarding refill request.    Thank you

## 2025-06-24 ENCOUNTER — RESULTS FOLLOW-UP (OUTPATIENT)
Age: 30
End: 2025-06-24

## 2025-06-25 DIAGNOSIS — N91.2 AMENORRHEA: Primary | ICD-10-CM

## 2025-07-01 ENCOUNTER — ROUTINE PRENATAL (OUTPATIENT)
Age: 30
End: 2025-07-01

## 2025-07-01 VITALS
HEIGHT: 64 IN | DIASTOLIC BLOOD PRESSURE: 81 MMHG | RESPIRATION RATE: 20 BRPM | SYSTOLIC BLOOD PRESSURE: 144 MMHG | HEART RATE: 125 BPM | WEIGHT: 227.6 LBS | BODY MASS INDEX: 38.86 KG/M2

## 2025-07-01 DIAGNOSIS — O46.8X1 SUBCHORIONIC HEMATOMA IN FIRST TRIMESTER, SINGLE OR UNSPECIFIED FETUS: ICD-10-CM

## 2025-07-01 DIAGNOSIS — O41.8X10 SUBCHORIONIC HEMATOMA IN FIRST TRIMESTER, SINGLE OR UNSPECIFIED FETUS: ICD-10-CM

## 2025-07-01 DIAGNOSIS — Z3A.01 6 WEEKS GESTATION OF PREGNANCY: Primary | ICD-10-CM

## 2025-07-01 PROCEDURE — 0500F INITIAL PRENATAL CARE VISIT: CPT | Performed by: OBSTETRICS & GYNECOLOGY

## 2025-07-01 NOTE — PROGRESS NOTES
NEW OB VISIT    Current pregnancy history:    Kirstie Lemus is a ,  29 y.o. female White (non-) Patient's last menstrual period was 2025..  She presents for the evaluation of amenorrhea and a positive pregnancy test.    Patient's last menstrual period was 2025.     Last Pap: normal obtained 2024.     LMP history:  The date of her LMP is 2025 certain.  Her last menstrual period was normal and lasted for 4 to 5 days.  A urine pregnancy test was positive 2025. She was not on the pill at conception.      Based on her LMP, her EDC is 2026 and her EGA is 9 weeks,4 days. Her menstrual cycles are regular and occur approximately every 28 days and range from 3 to 5 days. The last menses lasted  the usual number of days.      Ultrasound data:  She had an ultrasound done by the ultrasound tech today which revealed a viable abreu pregnancy with a gestational age of 6 weeks and 3 days giving an EDC of 2026.  6  Pregnancy symptoms:     Since her LMP she has experienced urinary frequency, breast tenderness, and nausea.   She has not been vomiting over the last few weeks.  Associated signs and symptoms which she denies: dysuria, discharge, vaginal bleeding.     She states she has gained weight:  Approximately 5 pounds over the last few weeks.     Relevant past pregnancy history:              She has the following pregnancy history:                She has no history of  delivery.     Relevant past medical history:(relevant to this pregnancy): noncontributory.         Substance history: negative for alcohol, tobacco and street drugs. Positive for nothing.    Exposure history: There is/are no indoor cat/s in the home.  She admits close contact with children on a regular basis.   She has had chicken pox or the vaccine in the past.   Patient denies issues with domestic violence.     Genetic Screening/Teratology Counseling: (Includes patient, baby's father, or anyone in

## 2025-07-01 NOTE — PROGRESS NOTES
Kirstie Lemus is a 29 y.o. female presents for a new pregnancy visit.    Chief Complaint   Patient presents with    Initial Prenatal Visit    Pregnancy Ultrasound    Blood Work            Patient's last menstrual period was 2025.    Last Pap: normal obtained 2024.    LMP history:  The date of her LMP is 2025 certain.  Her last menstrual period was normal and lasted for 4 to 5 days.  A urine pregnancy test was positive 2025. She was not on the pill at conception.     Based on her LMP, her EDC is 2026 and her EGA is 9 weeks,4 days. Her menstrual cycles are regular and occur approximately every 28 days and range from 3 to 5 days. The last menses lasted  the usual number of days.      Ultrasound data:  She had an ultrasound done by the ultrasound tech today which revealed a viable abreu pregnancy with a gestational age of 6 weeks and 3 days giving an EDC of 2026.  6  Pregnancy symptoms:    Since her LMP she has experienced urinary frequency, breast tenderness, and nausea.   She has not been vomiting over the last few weeks.  Associated signs and symptoms which she denies: dysuria, discharge, vaginal bleeding.    She states she has gained weight:  Approximately 5 pounds over the last few weeks.    Relevant past pregnancy history:   She has the following pregnancy history:     She has no history of  delivery.    Relevant past medical history:(relevant to this pregnancy): noncontributory.      Her occupation is:  works from home.     1. Have you been to the ER, urgent care clinic, or hospitalized since your last visit? No    2. Have you seen or consulted any other health care providers outside of the [facility] since your last visit? No    Examination chaperoned by DANIELLE HOLLIS CMA.

## 2025-07-02 ENCOUNTER — RESULTS FOLLOW-UP (OUTPATIENT)
Age: 30
End: 2025-07-02

## 2025-07-02 PROBLEM — R79.89 LOW SERUM VITAMIN D: Status: ACTIVE | Noted: 2025-07-02

## 2025-07-02 LAB
25(OH)D3+25(OH)D2 SERPL-MCNC: 25.8 NG/ML (ref 30–100)
ERYTHROCYTE [DISTWIDTH] IN BLOOD BY AUTOMATED COUNT: 13.5 % (ref 11.7–15.4)
HCT VFR BLD AUTO: 39.8 % (ref 34–46.6)
HGB BLD-MCNC: 12.9 G/DL (ref 11.1–15.9)
MCH RBC QN AUTO: 29.7 PG (ref 26.6–33)
MCHC RBC AUTO-ENTMCNC: 32.4 G/DL (ref 31.5–35.7)
MCV RBC AUTO: 92 FL (ref 79–97)
PLATELET # BLD AUTO: 365 X10E3/UL (ref 150–450)
RBC # BLD AUTO: 4.35 X10E6/UL (ref 3.77–5.28)
RPR SER QL: NON REACTIVE
WBC # BLD AUTO: 11.4 X10E3/UL (ref 3.4–10.8)

## 2025-07-03 PROBLEM — O09.899 RUBELLA NON-IMMUNE STATUS, ANTEPARTUM: Status: ACTIVE | Noted: 2025-07-03

## 2025-07-03 PROBLEM — Z28.39 RUBELLA NON-IMMUNE STATUS, ANTEPARTUM: Status: ACTIVE | Noted: 2025-07-03

## 2025-07-03 LAB
ABO GROUP BLD: NORMAL
BLD GP AB SCN SERPL QL: NEGATIVE
HBV SURFACE AG SERPL QL IA: NEGATIVE
HCV IGG SERPL QL IA: NON REACTIVE
HIV 1+2 AB+HIV1 P24 AG SERPL QL IA: NON REACTIVE
MEV IGG SER IA-ACNC: 151 AU/ML
MUV IGG SER IA-ACNC: <9 AU/ML
RH BLD: POSITIVE
RUBV IGG SERPL IA-ACNC: <0.9 INDEX

## 2025-07-08 LAB
EST. AVERAGE GLUCOSE BLD GHB EST-MCNC: 97 MG/DL
HBA1C MFR BLD: 5 %HB

## 2025-07-18 DIAGNOSIS — O46.8X1 SUBCHORIONIC HEMATOMA IN FIRST TRIMESTER, SINGLE OR UNSPECIFIED FETUS: Primary | ICD-10-CM

## 2025-07-18 DIAGNOSIS — Z3A.09 9 WEEKS GESTATION OF PREGNANCY: ICD-10-CM

## 2025-07-18 DIAGNOSIS — O41.8X10 SUBCHORIONIC HEMATOMA IN FIRST TRIMESTER, SINGLE OR UNSPECIFIED FETUS: Primary | ICD-10-CM

## 2025-07-21 ENCOUNTER — ROUTINE PRENATAL (OUTPATIENT)
Age: 30
End: 2025-07-21

## 2025-07-21 VITALS
HEART RATE: 104 BPM | SYSTOLIC BLOOD PRESSURE: 135 MMHG | DIASTOLIC BLOOD PRESSURE: 79 MMHG | WEIGHT: 226.8 LBS | BODY MASS INDEX: 38.93 KG/M2

## 2025-07-21 DIAGNOSIS — Z34.81 ENCOUNTER FOR SUPERVISION OF OTHER NORMAL PREGNANCY IN FIRST TRIMESTER: ICD-10-CM

## 2025-07-21 DIAGNOSIS — Z3A.11 11 WEEKS GESTATION OF PREGNANCY: Primary | ICD-10-CM

## 2025-07-21 DIAGNOSIS — Z34.81 ENCOUNTER FOR SUPERVISION OF OTHER NORMAL PREGNANCY, FIRST TRIMESTER: ICD-10-CM

## 2025-07-21 PROCEDURE — 0502F SUBSEQUENT PRENATAL CARE: CPT | Performed by: OBSTETRICS & GYNECOLOGY

## 2025-07-21 NOTE — PROGRESS NOTES
OB VISIT      Current EGA:   9w2d    Visit Notes:  Doing Well.  No  VB.     Ultrasound was done today and shows:   US OB TRANSVAGINAL  Result Date: 7/21/2025  Transvaginal ultrasound images are reviewed today and demonstrate a single live intrauterine gestation with active fetal cardiac activity.  Fetal ventricular heart rates 161 beats per minute.  Average crown-rump length measurements are consistent with 11 weeks and 5 days which does not confirm prior ultrasound findings.  Which would have been consistent with 9 weeks and 2 days today.  Due date by today's ultrasound is 2[birth year]2026.  There is a small hypoechoic space adjacent to gestational sac measuring 11 x 9 x 14 mm.  This is probably representative of a subchorionic bleed which appears stable.  Right ovary appears within normal limits.  Left ovary appears within normal limits there is a small cystic structure measuring 29 x 25 x 24 mm.  There is no free fluid seen in the cul-de-sac.     US OB TRANSVAGINAL  Result Date: 7/1/2025  Transabdominal ultrasound images are reviewed today and demonstrate a abreu live intrauterine gestation with active fetal cardiac activity.  Fetal ventricular heart rate 181 bpm.  Average crown-rump length measurements are consistent with 6 weeks and 3 days.  There is a yolk sac seen and measured today there is a hypoechoic collection in the lower uterine segment measuring 25 x 14 x 24 mm which likely represents a subchorionic hematoma right ovary appears within normal limits left ovary appears within normal limits with a small corpus luteum cyst present.  There is no free fluid seen in the cul-de-sac.      Will change due date date due to sure conception date cw this us find.     Total weight gain is -0.363 kg (-12.8 oz).   (Total expected for pregnancy is 5 kg (11 lb)-9 kg (19 lb))  Last Weight Metrics:      7/21/2025    11:16 AM 7/1/2025     3:33 PM 4/8/2025     9:22 AM 3/20/2025    12:17 PM 3/13/2025     2:09 PM 11/22/2024

## 2025-07-22 LAB
Lab: NORMAL
Lab: NORMAL

## 2025-07-28 LAB
Lab: NEGATIVE
Lab: NORMAL
NTRA CYSTIC FIBROSIS: NEGATIVE
NTRA DUCHENNE/BECKER MUSCULAR DYSTROPHY: NEGATIVE
NTRA FRAGILE X SYNDROME: NEGATIVE
NTRA SPINAL MUSCULAR ATROPHY: NEGATIVE

## 2025-08-13 ENCOUNTER — TELEPHONE (OUTPATIENT)
Age: 30
End: 2025-08-13

## 2025-08-15 ENCOUNTER — TELEPHONE (OUTPATIENT)
Age: 30
End: 2025-08-15

## 2025-08-22 SDOH — ECONOMIC STABILITY: INCOME INSECURITY: IN THE LAST 12 MONTHS, WAS THERE A TIME WHEN YOU WERE NOT ABLE TO PAY THE MORTGAGE OR RENT ON TIME?: NO

## 2025-08-22 SDOH — ECONOMIC STABILITY: FOOD INSECURITY: WITHIN THE PAST 12 MONTHS, THE FOOD YOU BOUGHT JUST DIDN'T LAST AND YOU DIDN'T HAVE MONEY TO GET MORE.: NEVER TRUE

## 2025-08-22 SDOH — ECONOMIC STABILITY: TRANSPORTATION INSECURITY
IN THE PAST 12 MONTHS, HAS LACK OF TRANSPORTATION KEPT YOU FROM MEETINGS, WORK, OR FROM GETTING THINGS NEEDED FOR DAILY LIVING?: NO

## 2025-08-22 SDOH — ECONOMIC STABILITY: TRANSPORTATION INSECURITY
IN THE PAST 12 MONTHS, HAS THE LACK OF TRANSPORTATION KEPT YOU FROM MEDICAL APPOINTMENTS OR FROM GETTING MEDICATIONS?: NO

## 2025-08-22 SDOH — ECONOMIC STABILITY: FOOD INSECURITY: WITHIN THE PAST 12 MONTHS, YOU WORRIED THAT YOUR FOOD WOULD RUN OUT BEFORE YOU GOT MONEY TO BUY MORE.: NEVER TRUE

## 2025-08-25 ENCOUNTER — ROUTINE PRENATAL (OUTPATIENT)
Age: 30
End: 2025-08-25

## 2025-08-25 VITALS
WEIGHT: 226.2 LBS | HEART RATE: 100 BPM | DIASTOLIC BLOOD PRESSURE: 78 MMHG | SYSTOLIC BLOOD PRESSURE: 118 MMHG | BODY MASS INDEX: 38.83 KG/M2

## 2025-08-25 DIAGNOSIS — Z3A.16 16 WEEKS GESTATION OF PREGNANCY: Primary | ICD-10-CM

## 2025-08-25 DIAGNOSIS — Z34.90 PREGNANCY, UNSPECIFIED GESTATIONAL AGE: Primary | ICD-10-CM

## 2025-08-25 PROCEDURE — 0502F SUBSEQUENT PRENATAL CARE: CPT | Performed by: OBSTETRICS & GYNECOLOGY

## 2025-08-25 RX ORDER — ASPIRIN 81 MG/1
81 TABLET, CHEWABLE ORAL DAILY
COMMUNITY

## 2025-08-25 RX ORDER — MULTIVIT-MIN/IRON/FOLIC ACID/K 18-600-40
2000 CAPSULE ORAL DAILY
COMMUNITY

## 2025-08-27 LAB
AFP INTERP SERPL-IMP: NORMAL
AFP INTERP SERPL-IMP: NORMAL
AFP MOM SERPL: 1.84
AFP SERPL QL: NORMAL
AFP SERPL-MCNC: 47.5 NG/ML
AGE AT DELIVERY: 30.4 YR
GA METHOD: NORMAL
GA: 16 WEEKS
IDDM PATIENT QL: NO
MULTIPLE PREGNANCY: NO
NEURAL TUBE DEFECT RISK FETUS: 1162 %
SERVICE CMNT-IMP: NORMAL